# Patient Record
Sex: FEMALE | Race: BLACK OR AFRICAN AMERICAN | NOT HISPANIC OR LATINO | URBAN - METROPOLITAN AREA
[De-identification: names, ages, dates, MRNs, and addresses within clinical notes are randomized per-mention and may not be internally consistent; named-entity substitution may affect disease eponyms.]

---

## 2022-06-13 ENCOUNTER — EMERGENCY (EMERGENCY)
Facility: HOSPITAL | Age: 37
LOS: 0 days | Discharge: HOME | End: 2022-06-13
Attending: STUDENT IN AN ORGANIZED HEALTH CARE EDUCATION/TRAINING PROGRAM
Payer: MEDICAID

## 2022-06-13 VITALS
DIASTOLIC BLOOD PRESSURE: 73 MMHG | WEIGHT: 227.08 LBS | SYSTOLIC BLOOD PRESSURE: 110 MMHG | HEART RATE: 93 BPM | HEIGHT: 66.93 IN | RESPIRATION RATE: 18 BRPM | OXYGEN SATURATION: 98 % | TEMPERATURE: 99 F
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
## 2022-06-13 DIAGNOSIS — M54.50 LOW BACK PAIN, UNSPECIFIED: ICD-10-CM
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
## 2022-06-13 DIAGNOSIS — K64.9 UNSPECIFIED HEMORRHOIDS: ICD-10-CM

## 2022-06-13 LAB
ALBUMIN SERPL ELPH-MCNC: 3.8 G/DL — SIGNIFICANT CHANGE UP (ref 3.3–5)
ALP SERPL-CCNC: 80 U/L — SIGNIFICANT CHANGE UP (ref 40–120)
ALT FLD-CCNC: 31 U/L — SIGNIFICANT CHANGE UP (ref 12–78)
ANION GAP SERPL CALC-SCNC: 6 MMOL/L — SIGNIFICANT CHANGE UP (ref 5–17)
APPEARANCE UR: CLEAR — SIGNIFICANT CHANGE UP
AST SERPL-CCNC: 26 U/L — SIGNIFICANT CHANGE UP (ref 15–37)
BACTERIA # UR AUTO: ABNORMAL
BILIRUB SERPL-MCNC: 0.6 MG/DL — SIGNIFICANT CHANGE UP (ref 0.2–1.2)
BILIRUB UR-MCNC: NEGATIVE — SIGNIFICANT CHANGE UP
BUN SERPL-MCNC: 8 MG/DL — SIGNIFICANT CHANGE UP (ref 7–23)
CALCIUM SERPL-MCNC: 9.1 MG/DL — SIGNIFICANT CHANGE UP (ref 8.5–10.1)
CHLORIDE SERPL-SCNC: 106 MMOL/L — SIGNIFICANT CHANGE UP (ref 96–108)
CO2 SERPL-SCNC: 28 MMOL/L — SIGNIFICANT CHANGE UP (ref 22–31)
COLOR SPEC: YELLOW — SIGNIFICANT CHANGE UP
CREAT SERPL-MCNC: 0.82 MG/DL — SIGNIFICANT CHANGE UP (ref 0.5–1.3)
DIFF PNL FLD: ABNORMAL
EGFR: 94 ML/MIN/1.73M2 — SIGNIFICANT CHANGE UP
EPI CELLS # UR: SIGNIFICANT CHANGE UP
GLUCOSE SERPL-MCNC: 93 MG/DL — SIGNIFICANT CHANGE UP (ref 70–99)
GLUCOSE UR QL: NEGATIVE MG/DL — SIGNIFICANT CHANGE UP
HCG SERPL-ACNC: 5 MIU/ML — HIGH
HCT VFR BLD CALC: 38.3 % — SIGNIFICANT CHANGE UP (ref 34.5–45)
HGB BLD-MCNC: 12.5 G/DL — SIGNIFICANT CHANGE UP (ref 11.5–15.5)
KETONES UR-MCNC: NEGATIVE — SIGNIFICANT CHANGE UP
LEUKOCYTE ESTERASE UR-ACNC: ABNORMAL
MCHC RBC-ENTMCNC: 27.7 PG — SIGNIFICANT CHANGE UP (ref 27–34)
MCHC RBC-ENTMCNC: 32.6 G/DL — SIGNIFICANT CHANGE UP (ref 32–36)
MCV RBC AUTO: 84.9 FL — SIGNIFICANT CHANGE UP (ref 80–100)
NITRITE UR-MCNC: NEGATIVE — SIGNIFICANT CHANGE UP
NRBC # BLD: 0 /100 WBCS — SIGNIFICANT CHANGE UP (ref 0–0)
PH UR: 6 — SIGNIFICANT CHANGE UP (ref 5–8)
PLATELET # BLD AUTO: 222 K/UL — SIGNIFICANT CHANGE UP (ref 150–400)
POTASSIUM SERPL-MCNC: 3.5 MMOL/L — SIGNIFICANT CHANGE UP (ref 3.5–5.3)
POTASSIUM SERPL-SCNC: 3.5 MMOL/L — SIGNIFICANT CHANGE UP (ref 3.5–5.3)
PROT SERPL-MCNC: 8.2 GM/DL — SIGNIFICANT CHANGE UP (ref 6–8.3)
PROT UR-MCNC: NEGATIVE MG/DL — SIGNIFICANT CHANGE UP
RBC # BLD: 4.51 M/UL — SIGNIFICANT CHANGE UP (ref 3.8–5.2)
RBC # FLD: 13.7 % — SIGNIFICANT CHANGE UP (ref 10.3–14.5)
RBC CASTS # UR COMP ASSIST: ABNORMAL /HPF (ref 0–4)
SODIUM SERPL-SCNC: 140 MMOL/L — SIGNIFICANT CHANGE UP (ref 135–145)
SP GR SPEC: 1.01 — SIGNIFICANT CHANGE UP (ref 1.01–1.02)
UROBILINOGEN FLD QL: NEGATIVE MG/DL — SIGNIFICANT CHANGE UP
WBC # BLD: 7.68 K/UL — SIGNIFICANT CHANGE UP (ref 3.8–10.5)
WBC # FLD AUTO: 7.68 K/UL — SIGNIFICANT CHANGE UP (ref 3.8–10.5)
WBC UR QL: ABNORMAL

## 2022-06-13 PROCEDURE — 99284 EMERGENCY DEPT VISIT MOD MDM: CPT

## 2022-06-13 RX ORDER — CEPHALEXIN 500 MG
1 CAPSULE ORAL
Qty: 14 | Refills: 0
Start: 2022-06-13 | End: 2022-06-19

## 2022-06-13 RX ORDER — IBUPROFEN 200 MG
1 TABLET ORAL
Qty: 28 | Refills: 0
Start: 2022-06-13 | End: 2022-06-19

## 2022-06-13 RX ORDER — KETOROLAC TROMETHAMINE 30 MG/ML
15 SYRINGE (ML) INJECTION ONCE
Refills: 0 | Status: DISCONTINUED | OUTPATIENT
Start: 2022-06-13 | End: 2022-06-13

## 2022-06-13 RX ORDER — LIDOCAINE 4 G/100G
1 CREAM TOPICAL ONCE
Refills: 0 | Status: COMPLETED | OUTPATIENT
Start: 2022-06-13 | End: 2022-06-13

## 2022-06-13 RX ADMIN — LIDOCAINE 1 PATCH: 4 CREAM TOPICAL at 11:24

## 2022-06-13 RX ADMIN — Medication 15 MILLIGRAM(S): at 11:26

## 2022-06-13 NOTE — ED PROVIDER NOTE - NS ED ATTENDING STATEMENT MOD
I have seen and examined this patient and fully participated in the care of this patient as the teaching attending.  The service was shared with the ALVAREZ.  I reviewed and verified the documentation and independently performed the documented:

## 2022-06-13 NOTE — ED ADULT TRIAGE NOTE - CHIEF COMPLAINT QUOTE
pt c/o right lower back pain x 2 days. pt also c/o dark red stool x 1 month. pt states menstrual cycle stopped x 5 years ago.

## 2022-06-13 NOTE — ED PROVIDER NOTE - PATIENT PORTAL LINK FT
You can access the FollowMyHealth Patient Portal offered by [facility] by registering at the following website: [email or web address]. By joining [employer]’s FollowMyHealth portal, you will also be able to view your health information using other applications (apps) compatible with our system.

## 2022-06-13 NOTE — ED PROVIDER NOTE - CLINICAL SUMMARY MEDICAL DECISION MAKING FREE TEXT BOX
37y Female complaining of right lower back pain, described as "burning", constant, non radiating, 7/10, worse with "bending forward", relieved by Advil. Pain started 2 days ago, suddenly as she bent over to fix her shoe.    - Routine blood work - CBC to check H/H given BPR complaint   - will check UA to r/o UTI/pyelo since young female with R Lower back pain   - Back pain most likely musculoskeletal in origin- as pt has hx of back pain a few months ago and was tx/ relieved by pain meds and steroidal injection in [facility].  Toradol IV and Lido patch now for pain management.   - no need for radiologic  studies at this time- had recent MRI in [facility], and currently  no suspicion of fracture ( pt denies trauma, no deformities or ROM abn noted on exam)          Patient denies recent trauma to her back, or falls;  denies numbness, tingling, weakness, weight bearing difficulties,  reports normal gait.

## 2022-06-13 NOTE — ED PROVIDER NOTE - ATTENDING CONTRIBUTION TO CARE
7y Female complaining of right lower back pain, described as "burning", constant, non radiating, 7/10, worse with "bending forward", relieved by Advil. Pain started 2 days ago, suddenly as she bent over to fix her shoe.  Patient states to have had same back pain a few months ago in [facility] ( she is in [facility]), at which point she had a MRI of her spine, was given pain medication and steroidal injection at site of pain; she states her back has been fine since until now. additionally she is experiencing some red blood with her BMs, she has a history of hemorrhoiuds and intermittently experinces this. no abdominal pain. no NVDC. no dizziness, sob, or weakness. vs within appropriate limits    General: Awake, alert and oriented. No acute distress. Well developed, hydrated and nourished. Appears stated age.   Skin: Skin in warm, dry and intact without rashes or lesions. Appropriate color for ethnicity  HENMT: head normocephalic and atraumatic; bilateral external ears without swelling. no nasal discharge. moist oral mucosa. supple neck, trachea midline  EYES: Conjunctiva clear. nonicteric sclera. EOM intact, Eyelids are normal in appearance without swelling or lesions.  Cardiac: well perfused  Respiratory: breathing comfortably on room air. no audible wheezing or stridor  Abdominal: nondistended, soft, mild right cva ttp  MSK: Neck and back are without deformity, visible external skin changes, or signs of trauma. Curvature of the cervical, thoracic, and lumbar spine are within normal limits. no external signs of trauma. no apparent deficits in ROM of any extremity, -slr raise. mild right para lumbar ttp  Neurological: The patient is awake, alert and oriented to person, place, and time with normal speech. CN 2-12 grossly intact. no apparent deficits. Memory is normal and thought process is intact. No gait abnormalities are appreciated.   Psychiatric: Appropriate mood and affect. Good judgement and insight. No visual or auditory hallucinations.    Pt p/w atraumatic low back pain with no fevers, chills, saddle anesthesia or perianal numbness, IVDU hx, urinary or bowel incontinence/retention. DDx: Likely musculoskeletal back pain vs disc herniation/radiculopathy. Considered DDX: cauda equina syndrome, vertebral compression fracture, epidural abscess, discitis, vertebral osteomyelitis, cord compression, or bone malignancy; but these are unlikely due to the HPI, exam, and above mentioned factors. consider mild pyelo as cva ttp and + ua, howeer nonseptic. pain resolved with nsaids. will dc with analgesia and abx. h/h wnl

## 2022-06-13 NOTE — ED ADULT NURSE NOTE - OBJECTIVE STATEMENT
Patient alert and oriented X 3, c/o right lower back pain x 2 days. pt also c/o dark red stool x 1 month. pt states menstrual cycle stopped x 5 years ago. Patient alert and oriented X 3, c/o right lower back pain x 2 days. States she took aleve last night and she was able to sleep so the pain was alleviated.  Patient also c/o dark red stool x 2 months every time she has a BM. Denies chest pain, SOB, dizziness, or lightheadedness.

## 2022-06-13 NOTE — ED PROVIDER NOTE - OBJECTIVE STATEMENT
37y Female complaining of right lower back pain, described as "burning", constant, non radiating, 7/10, worse with "bending forward", relieved by Advil. Pain started 2 days ago, suddenly as she bent over to fix her shoe.  Patient states to have had same back pain a few months ago in [facility] ( she is in [facility]), at which point she had a MRI of her spine, was given pain medication and steroidal injection at site of pain; she states her back has been fine since until now. Patient denies recent trauma to her back, or falls;  denies numbness, tingling, weakness, weight bearing difficulties,  reports normal gait. 37y Female complaining of right lower back pain, described as "burning", constant, non radiating, 7/10, worse with "bending forward", relieved by Advil. Pain started 2 days ago, suddenly as she bent over to fix her shoe.  Patient states to have had same back pain a few months ago in [facility] ( she is in [facility]), at which point she had a MRI of her spine, was given pain medication and steroidal injection at site of pain; she states her back has been fine since until now. Patient denies recent trauma to her back, or falls;  denies numbness, tingling, weakness, weight bearing difficulties,  reports normal gait. Denies pain, burning/discomfort during urination, denies hematuria.   * In addition patient reports x1 month of  blood per rectum. Pt states blood is bright red, blood is in toilet bowl after bowel movement only,  noticed blood on toilet paper after wiping, she describes stools as being brown in color; denies melena, reports + hemorrhoids.

## 2022-06-13 NOTE — ED PROVIDER NOTE - DISPOSITION TYPE
[facility] for Tobacco Control website --- [email or web address] --- [email or web address]
DISCHARGE